# Patient Record
Sex: FEMALE | Race: ASIAN | NOT HISPANIC OR LATINO | Employment: FULL TIME | URBAN - METROPOLITAN AREA
[De-identification: names, ages, dates, MRNs, and addresses within clinical notes are randomized per-mention and may not be internally consistent; named-entity substitution may affect disease eponyms.]

---

## 2021-05-28 ENCOUNTER — RECORDS - HEALTHEAST (OUTPATIENT)
Dept: ADMINISTRATIVE | Facility: CLINIC | Age: 46
End: 2021-05-28

## 2021-06-24 ENCOUNTER — HOSPITAL ENCOUNTER (EMERGENCY)
Dept: EMERGENCY MEDICINE | Facility: HOSPITAL | Age: 46
Discharge: HOME | End: 2021-06-25
Attending: EMERGENCY MEDICINE
Payer: COMMERCIAL

## 2021-06-24 DIAGNOSIS — R50.9 FEVER, UNSPECIFIED FEVER CAUSE: ICD-10-CM

## 2021-06-24 DIAGNOSIS — E11.9 DIABETES MELLITUS WITHOUT COMPLICATION (H): ICD-10-CM

## 2021-06-24 DIAGNOSIS — K08.89 PAIN, DENTAL: ICD-10-CM

## 2021-06-24 LAB
ALBUMIN SERPL-MCNC: 3.9 G/DL (ref 3.5–5)
ALP SERPL-CCNC: 96 U/L (ref 45–120)
ALT SERPL W P-5'-P-CCNC: 18 U/L (ref 0–45)
ANION GAP SERPL CALCULATED.3IONS-SCNC: 12 MMOL/L (ref 5–18)
AST SERPL W P-5'-P-CCNC: 15 U/L (ref 0–40)
BILIRUB SERPL-MCNC: 1.4 MG/DL (ref 0–1)
BUN SERPL-MCNC: 7 MG/DL (ref 8–22)
C REACTIVE PROTEIN LHE: 3.2 MG/DL (ref 0–0.8)
CALCIUM SERPL-MCNC: 9.5 MG/DL (ref 8.5–10.5)
CHLORIDE BLD-SCNC: 103 MMOL/L (ref 98–107)
CO2 SERPL-SCNC: 23 MMOL/L (ref 22–31)
CREAT SERPL-MCNC: 0.73 MG/DL (ref 0.6–1.1)
GFR SERPL CREATININE-BSD FRML MDRD: >60 ML/MIN/1.73M2
GLUCOSE BLD-MCNC: 213 MG/DL (ref 70–125)
LACTATE SERPL-SCNC: 1.2 MMOL/L (ref 0.7–2)
POTASSIUM BLD-SCNC: 3.4 MMOL/L (ref 3.5–5)
PROT SERPL-MCNC: 7.9 G/DL (ref 6–8)
SODIUM SERPL-SCNC: 138 MMOL/L (ref 136–145)

## 2021-06-25 ENCOUNTER — NURSE TRIAGE (OUTPATIENT)
Dept: NURSING | Facility: CLINIC | Age: 46
End: 2021-06-25

## 2021-06-25 ENCOUNTER — COMMUNICATION - HEALTHEAST (OUTPATIENT)
Dept: SCHEDULING | Facility: CLINIC | Age: 46
End: 2021-06-25

## 2021-06-25 NOTE — ED TRIAGE NOTES
Pt with fever, body ache and nausea since this am.  Pt went to urgent care.  Pt white count elevated.  Pt had fever of 101, took Ibuprofen.  Pt had covid vaccine in February.

## 2021-06-26 ENCOUNTER — COMMUNICATION - HEALTHEAST (OUTPATIENT)
Dept: SCHEDULING | Facility: CLINIC | Age: 46
End: 2021-06-26

## 2021-06-26 NOTE — ED PROVIDER NOTES
Expected Patient Referral to ED  7:21 PM    Referring Clinic/Provider:  Urgent Care    Reason for referral/Clinical facts:  Shaking chills, recent dental procedure, UA negative, strep negative, WBC 20,000.  Vaginal discharge with negative wet prep.  Concern for bacteremia given recent procedure and feels needs admission with IV antibiotics.  Did discuss no beds at [facility]    Recommendations provided:  Send to our ED or another ED with bed availability    Caller was informed that this institution does possess the capabilities and/or resources to provide for patient and should be transferred to our institution.    Based on the information provided, discussed that this patient likely is not a good candidate for direct admission to this institution and that provider could proceed as such.  If however direct admit is sought and any hurdles encountered, this ED would be happy to see the patient and evaluate.    Informed caller that recommendations provided are recommendations based only on the facts provided and that they responsible to accept or reject the advice, or to seek a formal in person consultation as needed and that this ED will see/treat patient should they arrive.      Ute Ashford DO  Emergency Medicine  [facility] EMERGENCY DEPARTMENT  [address]  [place] [address]  Dept: [phone]  Loc: [phone]     Ute Ashford DO  06/24/21 1925

## 2021-06-27 ENCOUNTER — HEALTH MAINTENANCE LETTER (OUTPATIENT)
Age: 46
End: 2021-06-27

## 2021-06-29 ENCOUNTER — COMMUNICATION - HEALTHEAST (OUTPATIENT)
Dept: FAMILY MEDICINE | Facility: CLINIC | Age: 46
End: 2021-06-29

## 2021-06-30 LAB
AEROBIC BLOOD CULTURE BOTTLE: NO GROWTH
ANAEROBIC BLOOD CULTURE BOTTLE: NO GROWTH

## 2021-07-04 NOTE — TELEPHONE ENCOUNTER
Telephone Encounter by Behr, Pamela at 6/29/2021  4:15 PM     Author: Behr, Pamela Service: -- Author Type: --    Filed: 6/29/2021  4:16 PM Encounter Date: 6/28/2021 Status: Signed    : Behr, Pamela       Pt called back and we scheduled appt

## 2021-07-04 NOTE — TELEPHONE ENCOUNTER
Lm ok to give pt a same day appt with Bahman for follow up on discharge bur then needs to est care 40 min appt when available

## 2021-07-04 NOTE — ED PROVIDER NOTES
ED Provider Notes by Froylan Rowan MD at 6/24/2021 10:40 PM     Author: Froylan Rowan MD Service: Emergency Medicine Author Type: Physician    Filed: 7/4/2021  2:30 PM Date of Service: 6/24/2021 10:40 PM Status: Signed    : Froylan Rowan MD (Physician)       EMERGENCY DEPARTMENT ENCOUNTER      NAME: Clinton Bragg  AGE: 46 y.o. female  YOB: 1975  MRN: [id number]  EVALUATION DATE & TIME: 6/24/2021  9:48 PM    PCP: Chino Flaherty MD    ED PROVIDER: Froylan Rowan M.D.      Chief Complaint   Patient presents with   ? Fever   ? Abnormal Lab         FINAL IMPRESSION:  1. Fever, unspecified fever cause    2. Diabetes mellitus without complication (H)    3. Pain, dental          ED COURSE & MEDICAL DECISION MAKING:    10:13PM I met with the patient for the initial interview and physical examination. Discussed plan for treatment and workup in the ED. PPE: Provider wore gloves and surgical mask.    Pertinent Labs & Imaging studies reviewed. (See chart for details)  46 y.o. female presents to the Emergency Department for evaluation of typical symptoms.  The patient was referred to the ER from her clinic.  She said that she had been at a dentist yesterday for a crown.  This morning she had multiple symptoms including myalgias, low-grade temperature, chills, sore throat, mild headache fatigue and ear pain.  She was seen in the urgent care where she had complained of some dysuria so a pelvic exam was done and wet prep was negative.  She did have blood test done and this revealed a white count of 20 and a hemoglobin A1c 11.4.  Additionally a Covid test it is pending.  The rapid strep test was done was negative t.  In the ER we rechecked her lab work to include a comprehensive metabolic profile, CRP and lactic acid as the other labs just been done earlier.  Her blood sugar this time was 213.  However she was not acidotic.  The CRP was just slightly elevated.  A chest x-ray was done and  did not show any pneumonia.  The patient will be started on Metformin as she has normal renal and liver function tests.  I did start her on a course of amoxicillin for her dental pain which may be the source of her fever and other symptoms.  She will be following up with her doctor to further manage her diabetes and to follow-up on the fever.  At the conclusion of the encounter I discussed the results of all of the tests and the disposition. The questions were answered. The patient or family acknowledged understanding and was agreeable with the care plan.       MEDICATIONS GIVEN IN THE EMERGENCY:  Medications   sodium chloride 0.9% 1,000 mL (0 mL Intravenous Stopped 6/25/21 0048)   acetaminophen tablet 1,000 mg (TYLENOL) (1,000 mg Oral Given 6/24/21 4722)       NEW PRESCRIPTIONS STARTED AT TODAY'S ER VISIT  Discharge Medication List as of 6/25/2021 12:48 AM      START taking these medications    Details   amoxicillin (AMOXIL) 875 MG tablet Take 1 tablet (875 mg total) by mouth 2 (two) times a day for 10 days., Starting Fri 6/25/2021, Until Mon 7/5/2021, Print      metFORMIN (GLUCOPHAGE) 500 MG tablet Take 1 tablet (500 mg total) by mouth 2 (two) times a day with meals., Starting Fri 6/25/2021, Print                =================================================================    HPI    Patient information was obtained from: patient    Use of Intrepreter: N/A         Clinton Bragg is a 46 y.o. female with a pertinent history of migraines and anemia who presents to this ED as a referral from clinic for evaluation of fever and abnormal lab.    Patient reports that she has had left lower dental pain for a while, had a crown put on it at the dentist yesterday without having a root canal, and felt nauseous last night at home, although no further dental pain. This morning, patient reports myalgias, fever to 100-101, chills, slight headache, sore throat, achy chest pain attributed to muscle aches, fatigue, and slight left  ear pain. Currently rates all of her symptoms 3-4/10. Took ibuprofen at home this afternoon but has not taken tylenol. She went to urgent care earlier today for these symptoms, also mentioned having some vaginal pain and dysuria and thought she might have a UTI, although UA was normal. Notes that her strep was negative, Covid-19 test was pending, WBC of 20.1, and A1c of 11.4. Patient received the [employer] Covid-19 vaccine in February 2020. Otherwise denies rhinorrhea, cough, shortness of breath, congestion, abdominal pain, flank pain, rash, redness on legs, recent tick bites, or any other complaints at this time.    Also reports having pelvic pain which is attributed to her menstrual cycle, as she is getting it in the next couple of days.    REVIEW OF SYSTEMS   Constitutional:  Denies weight loss or weakness, loss of appetite. Positive for fever (100-101), chills, fatigue.  Eyes:  No pain, diplopia, or vision loss  HENT:  Positive for dental pain (left lower; since resolved), sore throat, left ear pain. Negative for rhinorrhea, congestion.  Respiratory: No SOB, wheeze or cough.  Cardiovascular:  No GREENBERG, palpitations, syncope. Positive for chest pain (secondary to myalgias).  GI:  Denies abdominal pain, vomiting, dark/bloody stools, or diarrhea. Positive for nausea.  : Positive for vaginal pain (previously evaluated) and dysuria (previously evaluated). Negative for flank pain.  Musculoskeletal: Positive for myalgias.  Neuro: Positive for headache (slight).  Skin: Negative for rashes or redness.    All other systems negative unless noted in HPI    PAST MEDICAL HISTORY:  History reviewed. No pertinent past medical history.    PAST SURGICAL HISTORY:  Past Surgical History:   Procedure Laterality Date   ? IA LIGATE FALLOPIAN TUBE      Description: Tubal Ligation;  Recorded: 07/16/2008;   ? IA REMOVAL GALLBLADDER      Description: Cholecystectomy;  Recorded: 07/16/2008;  Comments: laparoscopy           CURRENT MEDICATIONS:     No current facility-administered medications on file prior to encounter.      No current outpatient medications on file prior to encounter.       ALLERGIES:  Allergies   Allergen Reactions   ? Naproxen    ? Naproxen Sodium Swelling     naprosyn as well     ? Tetracyclines      GI upset     ? Venom-Honey Bee Swelling       FAMILY HISTORY:  History reviewed. No pertinent family history.    SOCIAL HISTORY:   Social History     Socioeconomic History   ? Marital status:      Spouse name: None   ? Number of children: None   ? Years of education: None   ? Highest education level: None   Occupational History   ? None   Social Needs   ? Financial resource strain: None   ? Food insecurity     Worry: None     Inability: None   ? Transportation needs     Medical: None     Non-medical: None   Tobacco Use   ? Smoking status: Never Smoker   ? Smokeless tobacco: Never Used   Substance and Sexual Activity   ? Alcohol use: None   ? Drug use: None   ? Sexual activity: None   Lifestyle   ? Physical activity     Days per week: None     Minutes per session: None   ? Stress: None   Relationships   ? Social connections     Talks on phone: None     Gets together: None     Attends Sabianist service: None     Active member of club or organization: None     Attends meetings of clubs or organizations: None     Relationship status: None   ? Intimate partner violence     Fear of current or ex partner: None     Emotionally abused: None     Physically abused: None     Forced sexual activity: None   Other Topics Concern   ? None   Social History Narrative   ? None       VITALS:  No data found.    PHYSICAL EXAM    General Appearance: Alert, cooperative,  appears stated age   Head:  Normocephalic, without obvious abnormality, atraumatic  Eyes:  PERRL, conjunctiva/corneas clear  ENT: Mild tenderness over tooth #14.  There is no erythema or swelling at the base.  Lips, mucosa, and tongue normal; gums normal. Small tear of left inferior  TM.  Neck:  Supple, symmetrical, trachea midline, no adenopathy; no carotid  bruit or JVD  Chest:  No tenderness or deformity  Cardio: Regular rate and rhythm without murmur, full symmetric peripheral pulses  Pulm:  Clear to auscultation bilaterally, respirations unlabored, no wheezes, rales or rhonchi.  Back:  Symmetric, no curvature, ROM normal, no CVA tenderness  Abdomen:  Soft, non-tender, bowel sounds active all four quadrants, no masses, no organomegaly  Extremities:  Extremities normal, atraumatic, no cyanosis or edema  Skin:  Skin color, texture, turgor normal, no rashes or lesions  Lymph:  Cervical, supraclavicular, and axillary nodes normal  Neuro:  AOx3, CNs grossly intact, motor and sensory intact throughout the extremities.       LAB:  All pertinent labs reviewed and interpreted.  Results for orders placed or performed during the hospital encounter of 06/24/21   Comprehensive Metabolic Panel   Result Value Ref Range    Sodium 138 136 - 145 mmol/L    Potassium 3.4 (L) 3.5 - 5.0 mmol/L    Chloride 103 98 - 107 mmol/L    CO2 23 22 - 31 mmol/L    Anion Gap, Calculation 12 5 - 18 mmol/L    Glucose 213 (H) 70 - 125 mg/dL    BUN 7 (L) 8 - 22 mg/dL    Creatinine 0.73 0.60 - 1.10 mg/dL    GFR MDRD Af Amer >60 >60 mL/min/1.73m2    GFR MDRD Non Af Amer >60 >60 mL/min/1.73m2    Bilirubin, Total 1.4 (H) 0.0 - 1.0 mg/dL    Calcium 9.5 8.5 - 10.5 mg/dL    Protein, Total 7.9 6.0 - 8.0 g/dL    Albumin 3.9 3.5 - 5.0 g/dL    Alkaline Phosphatase 96 45 - 120 U/L    AST 15 0 - 40 U/L    ALT 18 0 - 45 U/L   C-Reactive Protein   Result Value Ref Range    CRP 3.2 (H) 0.0 - 0.8 mg/dL   Lactic Acid   Result Value Ref Range    Lactic Acid 1.2 0.7 - 2.0 mmol/L       RADIOLOGY:  Reviewed all pertinent imaging. Please see official radiology report.  EXAM: XR CHEST 2 VIEWS  LOCATION: [facility]  DATE/TIME: 6/24/2021 11:18 PM     INDICATION: fever and chills, r/o covid pna  COMPARISON: None.     IMPRESSION:       Heart size is normal. Lungs are clear bilaterally. Mediastinum and visualized bony structures are unremarkable.        I, Mitzy San, am serving as a scribe to document services personally performed by Dr. Rowan based on my observation and the provider's statements to me. I,  Froylan Rowan MD attest that Mitzy San is acting in a scribe capacity, has observed my performance of the services and has documented them in accordance with my direction.    Froylan Rowan M.D.  Emergency Medicine  [facility] EMERGENCY DEPARTMENT  [address]  [facility] [address]  Dept: [phone]  Loc: [phone]       Froylan Rowan MD  07/04/21 1517

## 2021-07-06 ENCOUNTER — COMMUNICATION - HEALTHEAST (OUTPATIENT)
Dept: FAMILY MEDICINE | Facility: CLINIC | Age: 46
End: 2021-07-06

## 2021-07-06 VITALS — WEIGHT: 195 LBS

## 2021-07-07 NOTE — TELEPHONE ENCOUNTER
New Appointment Needed  What is the reason for the visit:    Inpatient/ED Follow Up Appt Request  At what hospital or facility were you seen?: [facility]  What is the reason you were seen?: infection and diabetes   What date were you admitted?: date: 6/24/21  What date were you discharged?: date: 6/24/21  What was the recommended timeframe for your follow up appointment?: 1 week  Provider Preference: Patient would like to establish with Dr. Ruggiero since he see's her mom Yariel Bragg and sister Anson Bragg.  How soon do you need to be seen?: as soon as available. Patient does not want to run out the the medication that was prescribed to her.  Waitlist offered?: No  Okay to leave a detailed message:  Yes

## 2021-07-07 NOTE — TELEPHONE ENCOUNTER
"Calling for results of 6/24 Covid test. It appears the results of the test are still pending. Even though it says \"final result\" there is no result stated and results look like preliminary results only. Informed pt results and not yet available. Advised call back 48-72 hrs after test.       Coronavirus (COVID-19) Notification     Reason for call  Patient requesting results     Lab Result    Lab test 2019-nCoV rRt-PCR in process        RN Recommendations/Instructions per [facility]  Continue quarantee and following instructions until you receive the results     Please Contact your PCP clinic or return to the Emergency department if your:    Symptoms worsen or other concerning symptom's.    Patient informed that if test for COVID19 is POSITIVE, you will receive a call typically within 48 hours from the test date (date lab collected).  If NEGATIVE result, you will receive a letter in the mail or [employer].      Ching Caldera RN  "

## 2021-07-07 NOTE — TELEPHONE ENCOUNTER
Coronavirus (COVID-19) Notification     Reason for call  Patient requesting results     Lab Result    Lab test 2019-nCoV rRt-PCR in process        RN Recommendations/Instructions per [facility]  Continue quarantee and following instructions until you receive the results     Please Contact your PCP clinic or return to the Emergency department if your:    Symptoms worsen or other concerning symptom's.    Patient informed that if test for COVID19 is POSITIVE, you will receive a call typically within 48 hours from the test date (date lab collected).  If NEGATIVE result, you will receive a letter in the mail or [employer].      Christel Roth RN

## 2021-07-11 PROBLEM — K08.89 TOOTH PAIN: Status: ACTIVE | Noted: 2021-07-06

## 2021-07-11 PROBLEM — E11.9 DIABETES MELLITUS, TYPE 2 (H): Status: ACTIVE | Noted: 2021-07-06

## 2021-07-13 ENCOUNTER — VIRTUAL VISIT (OUTPATIENT)
Dept: PHARMACY | Facility: CLINIC | Age: 46
End: 2021-07-13
Payer: COMMERCIAL

## 2021-07-13 DIAGNOSIS — E11.9 TYPE 2 DIABETES MELLITUS WITHOUT COMPLICATION, WITHOUT LONG-TERM CURRENT USE OF INSULIN (H): Primary | ICD-10-CM

## 2021-07-13 DIAGNOSIS — S93.409A SPRAIN OF ANKLE: ICD-10-CM

## 2021-07-13 DIAGNOSIS — S93.409D SPRAIN OF ANKLE, UNSPECIFIED LATERALITY, UNSPECIFIED LIGAMENT, SUBSEQUENT ENCOUNTER: ICD-10-CM

## 2021-07-13 DIAGNOSIS — Z78.9 TAKES DIETARY SUPPLEMENTS: ICD-10-CM

## 2021-07-13 PROCEDURE — 99605 MTMS BY PHARM NP 15 MIN: CPT | Performed by: PHARMACIST

## 2021-07-13 PROCEDURE — 99606 MTMS BY PHARM EST 15 MIN: CPT | Performed by: PHARMACIST

## 2021-07-13 RX ORDER — FLASH GLUCOSE SENSOR
1 KIT MISCELLANEOUS
Qty: 6 EACH | Refills: 3 | Status: SHIPPED | OUTPATIENT
Start: 2021-07-13 | End: 2022-05-20

## 2021-07-13 RX ORDER — CHLORAL HYDRATE 500 MG
1 CAPSULE ORAL DAILY
COMMUNITY

## 2021-07-13 RX ORDER — MULTIVIT-MIN/IRON/FOLIC ACID/K 18-600-40
2000 CAPSULE ORAL DAILY
COMMUNITY

## 2021-07-13 RX ORDER — IBUPROFEN 200 MG
400 TABLET ORAL 2 TIMES DAILY PRN
COMMUNITY

## 2021-07-13 NOTE — PROGRESS NOTES
Medication Therapy Management (MTM) Encounter    ASSESSMENT:                            Medication Adherence/Access: No issues identified    1. Type 2 diabetes mellitus without complication, without long-term current use of insulin (H)  Not at goal A1c less than 7% per ADA guidelines, however on current dose of Metformin and to trying to watch her diet she has significantly improved her blood sugars.  Recommend to initiate freestyle lidia device, if covered by her insurance.  Reviewed benefits of this device, as well as resources to help teach her how to use it.  Recommend to titrate Metformin to goal dose 2000 mg daily, provide education for potential adverse effects as she increases the dose.  Reviewed if needed, she may benefit from addition of SGLT2 inhibitor or GLP-1 agonist both of which would help with her weight loss efforts as well as improving blood sugars.  Acknowledged that she is not very interested in injectable medications, will reevaluate once we start monitoring blood sugars remotely via CGM.    2. Ankle Sprain  Stable, reviewed importance of increasing her activity levels to help with weight loss and improving her blood sugars, however she would benefit from none impact activities such as swimming or biking to ensure that she does not resprained her ankle.  Recommend to continue Osteo Bi-Flex and ibuprofen as needed.    3. Takes dietary supplements  Stable, reviewed that her current supplements will not interact with the freestyle lidia device.  No vitamin D level on file however consider evaluating this in the future.  Recommend to continue current regimen.    PLAN:                            1.  Increase Metformin to 1500 mg daily for 2 weeks then increase to 2000 mg daily  2.  Initiate freestyle lidia    Follow-up: Return in about 2 weeks (around 7/27/2021) for Follow up, using a [employer].     SUBJECTIVE/OBJECTIVE:                          Clinton Bragg is a 46 year old female contacted via  "secure video for an initial visit. She was referred to me from Dr. Ruggiero.      Reason for visit: Medication Management Initial.    Allergies/ADRs: Reviewed in chart  Tobacco: She reports that she has never smoked. She has never used smokeless tobacco.  Alcohol: none  Caffeine: 1 cups/day of coffee   Activity: Due to a sprained ankle she is not as active as she would like to be.  She would previously walk or jog, but she is only more recently able to put more weight on her foot for a longer period of time.  Past Medical History: Reviewed in chart    Medication Adherence/Access: no issues reported    Type 2 Diabetes: This is a new diagnosis as of the end of June.  She has been taking Metformin 500 mg twice daily since June 25.  She has not experienced any gas or diarrhea adverse effects from this medication.  She has been poking her finger to check her blood sugars, but recognizes that she would benefit from a continuous glucose monitor.  When her father was alive he had 1 of these devices, and she was able to remotely monitor his blood sugars as well.  She believes that she would greatly benefit from being able to see how her blood sugars react to her diet and activity.  Her brother also has 1 of these devices.  She would feel comfortable with both a freestyle lidia or a Dexcom.  She does have a more sedentary job, and along with her ankle issues, and has been difficult to be more active.  Most recently her fasting blood sugars were 165, 185.  She has also taking a supplement called \"carbohydrate blocker.\"  She is wondering if she should continue to use this product.  Hemoglobin A1C   Date Value Ref Range Status   06/24/2021 11.7 (H) <=5.6 % Final      Arthritis: Due to ongoing aches and pains in her joints that she has started taking Osteo Bi-Flex which she believes does help, and as needed will use ibuprofen.    Takes dietary supplements: She is on several supplements, which she is trying to be better about taking " on a regular basis.  These include vitamin D, B12, and fish oil.  She had previously taken some additional supplements due to the coronavirus, but has since discontinued these since she is not vaccinated.  No vitamin D level on file.    Today's Vitals:   BP Readings from Last 3 Encounters:   07/06/21 136/80   06/24/21 (!) 158/89   ----------------    I spent 45 minutes with this patient today. All changes were made via collaborative practice agreement with Dr. Ruggiero. A copy of the visit note was provided to the patient's primary care provider.    The patient declined a summary of these recommendations.     Josse Slater, PharmD, BCACP  Medication Management (MTM) Pharmacist  [facility]    Telemedicine Visit Details  Type of service:  Video Conference via [employer]  Start Time: 1:30PM  End Time: 2:15PM  Originating Location (patient location): Home  Distant Location (provider location):  [facility]        Medication Therapy Recommendations  Diabetes mellitus, type 2 (H)    Current Medication: metFORMIN (GLUCOPHAGE) 500 MG tablet   Rationale: Dose too low - Dosage too low - Effectiveness   Recommendation: Increase Dose - Increase Metformin to 1500 mg daily for 2 weeks, then increase to 2000 mg daily   Status: Accepted per CPA          Current Medication: metFORMIN (GLUCOPHAGE) 500 MG tablet   Rationale: Medication requires monitoring - Needs additional monitoring   Recommendation: Self-Monitoring - Order freestyle lidia CGM   Status: Accepted per CPA

## 2021-07-14 NOTE — PATIENT INSTRUCTIONS
PLAN:                            1.  Increase Metformin to 1500 mg daily for 2 weeks then increase to 2000 mg daily  2.  Initiate freestyle lidia    Follow-up: Return in about 2 weeks (around 7/27/2021) for Follow up, using a [employer].

## 2021-07-28 ENCOUNTER — VIRTUAL VISIT (OUTPATIENT)
Dept: PHARMACY | Facility: CLINIC | Age: 46
End: 2021-07-28
Payer: COMMERCIAL

## 2021-07-28 DIAGNOSIS — E11.9 TYPE 2 DIABETES MELLITUS WITHOUT COMPLICATION, WITHOUT LONG-TERM CURRENT USE OF INSULIN (H): Primary | ICD-10-CM

## 2021-07-28 PROCEDURE — 99607 MTMS BY PHARM ADDL 15 MIN: CPT | Performed by: PHARMACIST

## 2021-07-28 PROCEDURE — 99606 MTMS BY PHARM EST 15 MIN: CPT | Performed by: PHARMACIST

## 2021-07-28 NOTE — PROGRESS NOTES
Medication Therapy Management (MTM) Encounter    ASSESSMENT:                            Medication Adherence/Access: No issues identified    1. Type 2 diabetes mellitus without complication, without long-term current use of insulin (H)  Not at goal A1c less than 7% per ADA guidelines, however after his CGM download over the last 2 weeks her blood sugars have been averaging an A1c of 7%.  Tolerating Metformin at 1500 mg daily, therefore recommend to titrate to max dose of 2000 mg daily, which will also help bring down her fasting blood sugars.  Encouraged her to continue to learn what the foods she is eating, and increase activity as able.  Reviewed use of skin tac to help provide a barrier for her CGM sensors and improve adhesion. Reviewed if needed, she may benefit from addition of SGLT2 inhibitor or GLP-1 agonist both of which would help with her weight loss efforts as well as improving blood sugars.  As a recent diagnosis, we have not yet started her on aspirin, statin, nor ACE inhibitor/ARB.  Recommend to evaluate microalbumin when follow-up A1c is collected.    PLAN:                            1. Increase metformin to 2000mg daily   2.  Utilize skin tac to help to tolerate your sensors    Follow-up: Return in about 4 weeks (around 8/25/2021) for Follow up, with me, using a video visit.    SUBJECTIVE/OBJECTIVE:                          Clinton Bragg is a 46 year old female contacted via secure video for a follow-up visit. She was referred to me from Dr. Flaherty.  Today's visit is a follow-up MTM visit from 7/13/21.      Reason for visit: Medication Management Follow up.    Allergies/ADRs: Reviewed in chart  Past Medical History: Reviewed in chart  Tobacco: She reports that she has never smoked. She has never used smokeless tobacco.  Alcohol: never used    Medication Adherence/Access: no issues reported    Type 2 Diabetes: Reviewed blood glucose data via remote monitoring of her freestyle lidia as shown below.  She  is on an extensive amount about how her blood sugars fluctuate with food and exercise.  She has increased the amount of exercise that she has been doing as tolerated with her ankle pain.  She finds that now when she has elevated blood sugars that she will get a headache.  Denies vision changes, she does continue to experience dry mouth.  She continues on 1500 mg daily dose of Metformin, tolerating without adverse effects.  She is found that she is more sensitive to the adhesive within a freestyle lidia sensor, she is wondering if there is anything she can do to help decrease redness and irritation.  Hemoglobin A1C   Date Value Ref Range Status   06/24/2021 11.7 (H) <=5.6 % Final      No results found for: UMALCR   No results found for: LDL  Creatinine   Date Value Ref Range Status   06/24/2021 0.73 0.60 - 1.10 mg/dL Final         Today's Vitals:   BP Readings from Last 3 Encounters:   07/06/21 136/80   06/24/21 (!) 158/89     ----------------      I spent 30 minutes with this patient today. All changes were made via collaborative practice agreement with Dr. Flaherty. A copy of the visit note was provided to the patient's primary care provider.    The patient declined a summary of these recommendations.     Josse Slater, PharmD, BCACP  Medication Management (MTM) Pharmacist  [facility]    Telemedicine Visit Details  Type of service:  Video Conference via [employer]  Start Time: 12:30PM  End Time: 1PM  Originating Location (patient location): Home  Distant Location (provider location):  [facility]     Medication Therapy Recommendations  No medication therapy recommendations to display

## 2021-07-29 NOTE — PATIENT INSTRUCTIONS
PLAN:                            1. Increase metformin to 2000mg daily   2.  Utilize skin tac to help to tolerate your sensors    Follow-up: Return in about 4 weeks (around 8/25/2021) for Follow up, with me, using a video visit.

## 2021-08-24 ENCOUNTER — VIRTUAL VISIT (OUTPATIENT)
Dept: PHARMACY | Facility: CLINIC | Age: 46
End: 2021-08-24
Payer: COMMERCIAL

## 2021-08-24 DIAGNOSIS — E11.9 TYPE 2 DIABETES MELLITUS WITHOUT COMPLICATION, WITHOUT LONG-TERM CURRENT USE OF INSULIN (H): Primary | ICD-10-CM

## 2021-08-24 PROCEDURE — 99606 MTMS BY PHARM EST 15 MIN: CPT | Performed by: PHARMACIST

## 2021-08-24 PROCEDURE — 99607 MTMS BY PHARM ADDL 15 MIN: CPT | Performed by: PHARMACIST

## 2021-08-24 NOTE — PATIENT INSTRUCTIONS
PLAN:                            1. Continue current medication regimen.     Follow-up: Return in about 8 weeks (around 10/19/2021) for Follow up, with me, using a MyChart eVisit.

## 2021-08-24 NOTE — PROGRESS NOTES
Medication Therapy Management (MTM) Encounter    ASSESSMENT:                            Medication Adherence/Access: No issues identified    1. Type 2 diabetes mellitus without complication, without long-term current use of insulin (H)  Not at goal A1c less than 7% per ADA guidelines, however after this is CGM download, her blood sugars are estimating at an A1c less than 7%.  Recommend to continue max dose Metformin, and utilizing continuous glucose monitor to help make decisions around food and activity.  If needed, she may benefit from addition of SGLT2 inhibitor or GLP-1 agonist both of which would help with her weight loss efforts as well as improving blood sugars.  As a recent diagnosis, we have not yet started her on aspirin, statin, nor ACE inhibitor/ARB.  Recommend to evaluate microalbumin when follow-up A1c is collected.  She will call to schedule follow-up with Dr. Ruggiero once her CT scan is completed, at that follow-up appointment recommend updated BMP, A1c, microalbumin, fasting lipid panel.    PLAN:                            1. Continue current medication regimen.     Follow-up: Return in about 8 weeks (around 10/19/2021) for Follow up, with me, using a [employer] eVisit.    SUBJECTIVE/OBJECTIVE:                          Clinton Bragg is a 46 year old female contacted via secure video for a follow-up visit. She was referred to me from Dr. Ruggiero.  Today's visit is a follow-up MTM visit from 7/13/21.      Reason for visit: Medication Management Follow up.    Allergies/ADRs: Reviewed in chart  Past Medical History: Reviewed in chart  Tobacco: She reports that she has never smoked. She has never used smokeless tobacco.  Alcohol: never used    Medication Adherence/Access: no issues reported    Type 2 Diabetes: Follow-up today to review his CGM data.  Over the last month she has been able to titrate her Metformin to 2000 mg daily, denies signs or symptoms of adverse effects.  She is been staying away from  carbohydrates, and placing notes when she does have a sandwich, etc.  She has not gotten on the scale or taken her weight within the last month since our last follow-up.  She believes that her close may be slightly looser.  She has been much more active, and has worked up to being able to do a 3 mile walk every day.  She has been able to see on her continuous glucose monitor that her blood sugars do significantly improve after exercise.  Her ankles actually feels significantly better with less pain and now that she has been more active.  She can now run up her stairs without being short of breath as well.  She is monitoring her blood sugars very frequently throughout the day, and does acknowledge that keeping her blood sugars low and monitoring so often may make her more anxious about the choices she is making throughout the day.  She is due for follow-up with her primary doctor, and she had a few other steps that she was to complete prior to this follow-up however she does have some additional bills that she needs to take care of first.  Hemoglobin A1C   Date Value Ref Range Status   06/24/2021 11.7 (H) <=5.6 % Final     Creatinine   Date Value Ref Range Status   06/24/2021 0.73 0.60 - 1.10 mg/dL Final           Today's Vitals:   BP Readings from Last 3 Encounters:   07/06/21 136/80   06/24/21 (!) 158/89     ----------------    I spent 30 minutes with this patient today. All changes were made via collaborative practice agreement with Dr. Flaherty. A copy of the visit note was provided to the patient's primary care provider.    The patient declined a summary of these recommendations.     Josse Slater, PharmD, BCACP  Medication Management (MTM) Pharmacist  [facility]    Telemedicine Visit Details  Type of service:  Video Conference via [employer]  Start Time: 12:30PM  End Time: 1PM  Originating Location (patient location): Home  Distant Location (provider location):  [facility]  [employer]     Medication Therapy Recommendations  No medication therapy recommendations to display

## 2021-10-17 ENCOUNTER — HEALTH MAINTENANCE LETTER (OUTPATIENT)
Age: 46
End: 2021-10-17

## 2021-12-10 ENCOUNTER — LAB (OUTPATIENT)
Dept: LAB | Facility: CLINIC | Age: 46
End: 2021-12-10
Payer: COMMERCIAL

## 2021-12-10 DIAGNOSIS — E11.9 DIABETES MELLITUS WITHOUT COMPLICATION (H): ICD-10-CM

## 2021-12-10 DIAGNOSIS — E11.9 TYPE 2 DIABETES MELLITUS WITHOUT COMPLICATION, WITHOUT LONG-TERM CURRENT USE OF INSULIN (H): ICD-10-CM

## 2021-12-10 LAB
ALBUMIN SERPL-MCNC: 3.8 G/DL (ref 3.5–5)
ALP SERPL-CCNC: 47 U/L (ref 45–120)
ALT SERPL W P-5'-P-CCNC: 11 U/L (ref 0–45)
ANION GAP SERPL CALCULATED.3IONS-SCNC: 8 MMOL/L (ref 5–18)
AST SERPL W P-5'-P-CCNC: 14 U/L (ref 0–40)
BILIRUB SERPL-MCNC: 1.2 MG/DL (ref 0–1)
BUN SERPL-MCNC: 12 MG/DL (ref 8–22)
CALCIUM SERPL-MCNC: 9 MG/DL (ref 8.5–10.5)
CHLORIDE BLD-SCNC: 107 MMOL/L (ref 98–107)
CHOLEST SERPL-MCNC: 193 MG/DL
CO2 SERPL-SCNC: 23 MMOL/L (ref 22–31)
CREAT SERPL-MCNC: 0.66 MG/DL (ref 0.6–1.1)
CREAT UR-MCNC: 70 MG/DL
FASTING STATUS PATIENT QL REPORTED: YES
GFR SERPL CREATININE-BSD FRML MDRD: >90 ML/MIN/1.73M2
GLUCOSE BLD-MCNC: 115 MG/DL (ref 70–125)
HBA1C MFR BLD: 6 % (ref 0–5.6)
HDLC SERPL-MCNC: 53 MG/DL
LDLC SERPL CALC-MCNC: 111 MG/DL
MICROALBUMIN UR-MCNC: <0.5 MG/DL (ref 0–1.99)
MICROALBUMIN/CREAT UR: NORMAL MG/G{CREAT}
POTASSIUM BLD-SCNC: 4.2 MMOL/L (ref 3.5–5)
PROT SERPL-MCNC: 7 G/DL (ref 6–8)
SODIUM SERPL-SCNC: 138 MMOL/L (ref 136–145)
TRIGL SERPL-MCNC: 144 MG/DL

## 2021-12-10 PROCEDURE — 36415 COLL VENOUS BLD VENIPUNCTURE: CPT

## 2021-12-10 PROCEDURE — 83036 HEMOGLOBIN GLYCOSYLATED A1C: CPT

## 2021-12-10 PROCEDURE — 80053 COMPREHEN METABOLIC PANEL: CPT

## 2021-12-10 PROCEDURE — 80061 LIPID PANEL: CPT

## 2021-12-10 PROCEDURE — 82043 UR ALBUMIN QUANTITATIVE: CPT

## 2022-01-26 ENCOUNTER — NURSE TRIAGE (OUTPATIENT)
Dept: NURSING | Facility: CLINIC | Age: 47
End: 2022-01-26
Payer: COMMERCIAL

## 2022-01-26 NOTE — TELEPHONE ENCOUNTER
Patient is calling and states that Clinton tested positive for covid on Saturday Jan 22nd.  Symptoms are congestion and sore throat and having coughing mucus and head pressure.  Today is having ear pressure.  Denies fever and denies shortness of breath.  Symptoms started on Jan 17th.  Patient is wanting to know if she would be a candidate for covid merk medication.  Blood sugar has been in good range.  Denies chest pain and denies shortness of breath.  Patient is requesting to speak with Brendan Dunne about Merk medication for covid.  Please phone [employer].    COVID 19 Nurse Triage Plan/Patient Instructions    Please be aware that novel coronavirus (COVID-19) may be circulating in the community. If you develop symptoms such as fever, cough, or SOB or if you have concerns about the presence of another infection including coronavirus (COVID-19), please contact your health care provider or visit [email or web address].     Disposition/Instructions    Home care recommended. Follow home care protocol based instructions.    Thank you for taking steps to prevent the spread of this virus.  o Limit your contact with others.  o Wear a simple mask to cover your cough.  o Wash your hands well and often.    Resources    [facility]: About COVID-19: [email or web address]    CDC: What to Do If You're Sick: www.cdc.gov/coronavirus/2019-ncov/about/steps-when-sick.html    CDC: Ending Home Isolation: www.cdc.gov/coronavirus/2019-ncov/hcp/disposition-in-home-patients.html     CDC: Caring for Someone: www.cdc.gov/coronavirus/2019-ncov/if-you-are-sick/care-for-someone.html     [facility]: Interim Guidance for Hospital Discharge to Home: www.health.[facility].[place].us/diseases/coronavirus/hcp/hospdischarge.pdf    [facility] clinical trials (COVID-19 research studies): [email or web address]     Below are the COVID-19 hotlines at the [place] Department of Health ([facility]). Interpreters are  available.   o For health questions: Call 715-242-4906 or 1-475.752.9085 (7 a.m. to 7 p.m.)  o For questions about schools and childcare: Call 689-644-3561 or 1-756.511.7178 (7 a.m. to 7 p.m.)                       Reason for Disposition    [1] COVID-19 diagnosed by positive lab test AND [2] mild symptoms (e.g., cough, fever, others) AND [3] no complications or SOB    Additional Information    Negative: SEVERE difficulty breathing (e.g., struggling for each breath, speaks in single words)    Negative: Difficult to awaken or acting confused (e.g., disoriented, slurred speech)    Negative: Bluish (or gray) lips or face now    Negative: Shock suspected (e.g., cold/pale/clammy skin, too weak to stand, low BP, rapid pulse)    Negative: Sounds like a life-threatening emergency to the triager    Negative: SEVERE or constant chest pain or pressure (Exception: mild central chest pain, present only when coughing)    Negative: MODERATE difficulty breathing (e.g., speaks in phrases, SOB even at rest, pulse 100-120)    Negative: [1] Headache AND [2] stiff neck (can't touch chin to chest)    Negative: MILD difficulty breathing (e.g., minimal/no SOB at rest, SOB with walking, pulse <100)    Negative: Chest pain or pressure    Negative: Patient sounds very sick or weak to the triager    Negative: Fever > 103 F (39.4 C)    Negative: [1] Fever > 101 F (38.3 C) AND [2] age > 60 years    Negative: [1] Fever > 100.0 F (37.8 C) AND [2] bedridden (e.g., nursing home patient, CVA, chronic illness, recovering from surgery)    Negative: HIGH RISK for severe COVID complications (e.g., age > 64 years, obesity with BMI > 25, pregnant, chronic lung disease or other chronic medical condition)  (Exception: Already seen by PCP and no new or worsening symptoms.)    Negative: [1] HIGH RISK patient AND [2] influenza is widespread in the community AND [3] ONE OR MORE respiratory symptoms: cough, sore throat, runny or stuffy nose    Negative: [1] HIGH  RISK patient AND [2] influenza exposure within the last 7 days AND [3] ONE OR MORE respiratory symptoms: cough, sore throat, runny or stuffy nose    Negative: [1] COVID-19 infection suspected by caller or triager AND [2] mild symptoms (cough, fever, or others) AND [3] negative COVID-19 rapid test    Negative: Fever present > 3 days (72 hours)    Negative: [1] Fever returns after gone for over 24 hours AND [2] symptoms worse or not improved    Negative: [1] Continuous (nonstop) coughing interferes with work or school AND [2] no improvement using cough treatment per protocol    Negative: Cough present > 3 weeks    Negative: [1] COVID-19 diagnosed by positive lab test AND [2] NO symptoms (e.g., cough, fever, others)    Protocols used: CORONAVIRUS (COVID-19) DIAGNOSED OR [id number] 8.25.2021

## 2022-01-26 NOTE — TELEPHONE ENCOUNTER
Contacted patient. Discussed symptomatic treatment for patient. Patient states she is taking sudafed, fluticasone, salt water nasal washes and will start taking Tylenol or Ibuprofen as needed. Patient states she is feeling tired and has congestion in her head. Discussed with patient to schedule an E-visit through [employer] if she'd like to discuss her symptoms further.  Discussed with patient she is out of the 5 day from symptoms window for medication treatment of Paxlovid. Patient states understanding. No further concerns or questions.

## 2022-01-26 NOTE — TELEPHONE ENCOUNTER
This would need to be a visit to discuss, but in general this medication is only indicated in the first five days of symptoms.  She is out of that window so she is not a candidate

## 2022-04-03 ENCOUNTER — HEALTH MAINTENANCE LETTER (OUTPATIENT)
Age: 47
End: 2022-04-03

## 2022-05-17 DIAGNOSIS — E11.9 TYPE 2 DIABETES MELLITUS WITHOUT COMPLICATION, WITHOUT LONG-TERM CURRENT USE OF INSULIN (H): ICD-10-CM

## 2022-05-17 NOTE — TELEPHONE ENCOUNTER
Reason for Call:  Medication or medication refill:    Do you use a [facility]?  Name of the pharmacy and phone number for the current request:  [facility]-updated    Name of the medication requested:   Continuous Blood Gluc Sensor (FREESTYLE FABIO 14 DAY SENSOR) MISC 6 each 3 7/13/2021  --   Sig - Route: 1 Device every 14 days Change sensor as directed every 14 days - Does not apply   Sent to pharmacy as: FreeStyle Fabio 14 Day Sensor       Pt is also requseting Freestyle/ Abbott test strips and needles she tests when she changes her sensor about every 2 weeks and about 4 times per day.  And also whemn her blood sugar is too high to low then she will test as well.      Other request: pt used last one and pharmacy stated not refills.  Pt does have appt in July with Dr Ruggiero    Can we leave a detailed message on this number? YES    Phone number patient can be reached at: Cell number on file:    Telephone Information:   Mobile [phone]       Best Time: any    Call taken on 5/17/2022 at 12:51 PM by Pam J. Behr

## 2022-05-19 NOTE — TELEPHONE ENCOUNTER
Pending orders, will run through protocol.     Linda Santana RN  [facility] Nurse Advisor

## 2022-05-19 NOTE — TELEPHONE ENCOUNTER
Routing refill request to provider for review/approval because:  Unable to find prior order for test strips and needles.  RX for the sensor is pended.  Please write orders as desired.      Linda Santana RN  [facility] Nurse Advisor

## 2022-05-20 RX ORDER — FLASH GLUCOSE SENSOR
1 KIT MISCELLANEOUS
Qty: 6 EACH | Refills: 3 | Status: SHIPPED | OUTPATIENT
Start: 2022-05-20 | End: 2022-07-01

## 2022-07-01 ENCOUNTER — OFFICE VISIT (OUTPATIENT)
Dept: FAMILY MEDICINE | Facility: CLINIC | Age: 47
End: 2022-07-01
Payer: COMMERCIAL

## 2022-07-01 VITALS
SYSTOLIC BLOOD PRESSURE: 132 MMHG | DIASTOLIC BLOOD PRESSURE: 78 MMHG | HEART RATE: 74 BPM | WEIGHT: 179.2 LBS | OXYGEN SATURATION: 99 %

## 2022-07-01 DIAGNOSIS — G43.709 CHRONIC MIGRAINE WITHOUT AURA WITHOUT STATUS MIGRAINOSUS, NOT INTRACTABLE: ICD-10-CM

## 2022-07-01 DIAGNOSIS — E11.9 TYPE 2 DIABETES MELLITUS WITHOUT COMPLICATION, WITHOUT LONG-TERM CURRENT USE OF INSULIN (H): Primary | ICD-10-CM

## 2022-07-01 DIAGNOSIS — R41.3 MEMORY CHANGE: ICD-10-CM

## 2022-07-01 DIAGNOSIS — E53.8 VITAMIN B12 DEFICIENCY (NON ANEMIC): ICD-10-CM

## 2022-07-01 DIAGNOSIS — Z13.21 ENCOUNTER FOR VITAMIN DEFICIENCY SCREENING: ICD-10-CM

## 2022-07-01 LAB
ALBUMIN SERPL BCG-MCNC: 4.2 G/DL (ref 3.5–5.2)
ALP SERPL-CCNC: 53 U/L (ref 35–104)
ALT SERPL W P-5'-P-CCNC: 19 U/L (ref 10–35)
ANION GAP SERPL CALCULATED.3IONS-SCNC: 14 MMOL/L (ref 7–15)
AST SERPL W P-5'-P-CCNC: 19 U/L (ref 10–35)
BILIRUB SERPL-MCNC: 0.7 MG/DL
BUN SERPL-MCNC: 10.5 MG/DL (ref 6–20)
CALCIUM SERPL-MCNC: 9.4 MG/DL (ref 8.6–10)
CHLORIDE SERPL-SCNC: 104 MMOL/L (ref 98–107)
CREAT SERPL-MCNC: 0.48 MG/DL (ref 0.51–0.95)
DEPRECATED HCO3 PLAS-SCNC: 22 MMOL/L (ref 22–29)
ERYTHROCYTE [DISTWIDTH] IN BLOOD BY AUTOMATED COUNT: 14 % (ref 10–15)
GFR SERPL CREATININE-BSD FRML MDRD: >90 ML/MIN/1.73M2
GLUCOSE SERPL-MCNC: 115 MG/DL (ref 70–99)
HBA1C MFR BLD: 6.3 % (ref 0–5.6)
HCT VFR BLD AUTO: 35.8 % (ref 35–47)
HGB BLD-MCNC: 11.4 G/DL (ref 11.7–15.7)
MAGNESIUM SERPL-MCNC: 1.9 MG/DL (ref 1.7–2.3)
MCH RBC QN AUTO: 24 PG (ref 26.5–33)
MCHC RBC AUTO-ENTMCNC: 31.8 G/DL (ref 31.5–36.5)
MCV RBC AUTO: 75 FL (ref 78–100)
PLATELET # BLD AUTO: 327 10E3/UL (ref 150–450)
POTASSIUM SERPL-SCNC: 4.2 MMOL/L (ref 3.4–5.3)
PROT SERPL-MCNC: 7.3 G/DL (ref 6.4–8.3)
RBC # BLD AUTO: 4.75 10E6/UL (ref 3.8–5.2)
SODIUM SERPL-SCNC: 140 MMOL/L (ref 136–145)
TSH SERPL DL<=0.005 MIU/L-ACNC: 1.89 UIU/ML (ref 0.3–4.2)
VIT B12 SERPL-MCNC: 2155 PG/ML (ref 232–1245)
WBC # BLD AUTO: 9.8 10E3/UL (ref 4–11)

## 2022-07-01 PROCEDURE — 82607 VITAMIN B-12: CPT | Performed by: FAMILY MEDICINE

## 2022-07-01 PROCEDURE — 83735 ASSAY OF MAGNESIUM: CPT | Performed by: FAMILY MEDICINE

## 2022-07-01 PROCEDURE — 80053 COMPREHEN METABOLIC PANEL: CPT | Performed by: FAMILY MEDICINE

## 2022-07-01 PROCEDURE — 83036 HEMOGLOBIN GLYCOSYLATED A1C: CPT | Performed by: FAMILY MEDICINE

## 2022-07-01 PROCEDURE — 99213 OFFICE O/P EST LOW 20 MIN: CPT | Performed by: FAMILY MEDICINE

## 2022-07-01 PROCEDURE — 36415 COLL VENOUS BLD VENIPUNCTURE: CPT | Performed by: FAMILY MEDICINE

## 2022-07-01 PROCEDURE — 85027 COMPLETE CBC AUTOMATED: CPT | Performed by: FAMILY MEDICINE

## 2022-07-01 PROCEDURE — 84443 ASSAY THYROID STIM HORMONE: CPT | Performed by: FAMILY MEDICINE

## 2022-07-01 PROCEDURE — 82306 VITAMIN D 25 HYDROXY: CPT | Performed by: FAMILY MEDICINE

## 2022-07-01 RX ORDER — FLASH GLUCOSE SENSOR
1 KIT MISCELLANEOUS
Qty: 6 EACH | Refills: 3 | Status: SHIPPED | OUTPATIENT
Start: 2022-07-01 | End: 2023-10-02

## 2022-07-01 NOTE — PROGRESS NOTES
Patient Instructions   Thanks for coming in Clinton    Say hellisa to everybody    Lets have you do your A1c today    Repeated in 4 months    Your blood pressure is very much at goal      Goal A1c for you is less than 6.5%    Continue to stay active  Eliminate added sugars    Replace vitamin B12 as this is related to metformin        Problem List Items Addressed This Visit        Nervous and Auditory    Migraine Headache       Endocrine    Diabetes mellitus, type 2 (H) - Primary    Relevant Medications    Continuous Blood Gluc Sensor (FREESTYLE FABIO 14 DAY SENSOR) MISC    metFORMIN (GLUCOPHAGE) 500 MG tablet    Other Relevant Orders    Hemoglobin A1c    Comprehensive metabolic panel (BMP + Alb, Alk Phos, ALT, AST, Total. Bili, TP)    CBC with platelets    TSH with free T4 reflex    Magnesium      Other Visit Diagnoses     Memory change        Vitamin B12 deficiency (non anemic)        Relevant Orders    Vitamin B12    Encounter for vitamin deficiency screening        Relevant Orders    Vitamin D Deficiency            Subjective   Clinton is a 47 year old, presenting for the following health issues:  Diabetes and Recheck Medication      History of Present Illness       Diabetes:   She presents for follow up of diabetes.  She is checking home blood glucose with a continuous glucose monitor.  She checks blood glucose before and after meals.  Blood glucose is sometimes over 200 and sometimes under 70. She is aware of hypoglycemia symptoms including dizziness. She has no concerns regarding her diabetes at this time.  She is not experiencing numbness or burning in feet, excessive thirst, blurry vision, weight changes or redness, sores or blisters on feet. The patient has not had a diabetic eye exam in the last 12 months.         She eats 0-1 servings of fruits and vegetables daily.She consumes 1 sweetened beverage(s) daily.She exercises with enough effort to increase her heart rate 10 to 19 minutes per day.  She exercises with  pt comes to ED for allergic reaction. pt was seen at [facility] on Sunday for allergic reaction to plums. pt was told to take benadryl and prednisone. pt states the meds are not helping the hives so she decided to come back to the ER. pt was told to go to an allergist but she was not able to make an appt. pt VSS pt in NAD resp even and unlabored. enough effort to increase her heart rate 3 or less days per week.   She is taking medications regularly.             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  Physical Exam   Lungs are clear  Cardiac no murmur  Recheck of her blood pressure 132/78  Lower extremity edema  Normal monofilament testing                  .  ..

## 2022-07-01 NOTE — PATIENT INSTRUCTIONS
Thanks for coming in Clinton    Say soledadlo to everybody    Lets have you do your A1c today    Repeated in 4 months    Your blood pressure is very much at goal      Goal A1c for you is less than 6.5%    Continue to stay active  Eliminate added sugars    Replace vitamin B12 as this is related to metformin

## 2022-07-02 LAB — DEPRECATED CALCIDIOL+CALCIFEROL SERPL-MC: 22 UG/L (ref 20–75)

## 2022-07-24 ENCOUNTER — HEALTH MAINTENANCE LETTER (OUTPATIENT)
Age: 47
End: 2022-07-24

## 2022-08-09 ENCOUNTER — TELEPHONE (OUTPATIENT)
Dept: OPHTHALMOLOGY | Facility: CLINIC | Age: 47
End: 2022-08-09

## 2022-08-09 NOTE — TELEPHONE ENCOUNTER
[facility]    Phone Message    May a detailed message be left on voicemail: yes     Reason for Call: Appointment Intake    Referring Provider Name: Self   Diagnosis and/or Symptoms: Floater   - Pt states she has had a floater since April , has not been able to have seen due to her mom being ill .    Per protocols send high priority TE to clinic pool , please reach out to pt to schedule .thank you     Action Taken: Other: eye     Travel Screening: Not Applicable

## 2022-08-09 NOTE — TELEPHONE ENCOUNTER
Spoke to patient at 1350    Pt would like to tranfer care to Ophthalmologist for floater in eye since April.    Pt would like updated glasses Rx    --last eye exam about a year ago.    Scheduled exam tomorrow with Dr. Ospina    Pt aware of date/time/location/duration/hospital based clinic/non-[employer] insurance/main clinic number.    Pt also wears contacts and aware separate exam with contact lens provider    Scheduled with Dr. Mckinnon on a Friday at [facility] location-- September 23rd    Pt aware of date/time/location at [facility]    Chino Nicholas RN 1:54 PM 08/09/22

## 2022-08-12 ENCOUNTER — OFFICE VISIT (OUTPATIENT)
Dept: OPHTHALMOLOGY | Facility: CLINIC | Age: 47
End: 2022-08-12
Attending: STUDENT IN AN ORGANIZED HEALTH CARE EDUCATION/TRAINING PROGRAM
Payer: COMMERCIAL

## 2022-08-12 DIAGNOSIS — Z01.00 DIABETIC EYE EXAM (H): Primary | ICD-10-CM

## 2022-08-12 DIAGNOSIS — Z97.3 USES CONTACT LENSES: ICD-10-CM

## 2022-08-12 DIAGNOSIS — E11.9 DIABETIC EYE EXAM (H): Primary | ICD-10-CM

## 2022-08-12 DIAGNOSIS — H52.4 MYOPIA WITH PRESBYOPIA OF BOTH EYES: ICD-10-CM

## 2022-08-12 DIAGNOSIS — H43.811 POSTERIOR VITREOUS DETACHMENT OF RIGHT EYE: ICD-10-CM

## 2022-08-12 DIAGNOSIS — H52.13 MYOPIA WITH PRESBYOPIA OF BOTH EYES: ICD-10-CM

## 2022-08-12 PROCEDURE — 92004 COMPRE OPH EXAM NEW PT 1/>: CPT | Performed by: STUDENT IN AN ORGANIZED HEALTH CARE EDUCATION/TRAINING PROGRAM

## 2022-08-12 PROCEDURE — G0463 HOSPITAL OUTPT CLINIC VISIT: HCPCS

## 2022-08-12 ASSESSMENT — REFRACTION_WEARINGRX
OS_SPHERE: -7.25
OD_CYLINDER: SPHERE
OD_SPHERE: -6.00
SPECS_TYPE: SVL
OS_CYLINDER: +0.25
OS_AXIS: 007

## 2022-08-12 ASSESSMENT — CONF VISUAL FIELD
OD_NORMAL: 1
METHOD: COUNTING FINGERS
OS_NORMAL: 1

## 2022-08-12 ASSESSMENT — VISUAL ACUITY
OD_CC+: +2
OD_CC: 20/25
OS_CC: 20/25
OS_CC+: +2
METHOD: SNELLEN - LINEAR

## 2022-08-12 ASSESSMENT — REFRACTION_MANIFEST
OD_SPHERE: -6.25
OD_CYLINDER: SPHERE
OS_AXIS: 005
OD_ADD: +2.00
OS_SPHERE: -7.50
OS_ADD: +2.00
OS_CYLINDER: +0.25

## 2022-08-12 ASSESSMENT — TONOMETRY
IOP_METHOD: ICARE
OS_IOP_MMHG: 16
OD_IOP_MMHG: 13

## 2022-08-12 ASSESSMENT — EXTERNAL EXAM - RIGHT EYE: OD_EXAM: NORMAL

## 2022-08-12 ASSESSMENT — EXTERNAL EXAM - LEFT EYE: OS_EXAM: NORMAL

## 2022-08-12 ASSESSMENT — CUP TO DISC RATIO
OS_RATIO: 0.1
OD_RATIO: 0.1

## 2022-08-12 ASSESSMENT — SLIT LAMP EXAM - LIDS
COMMENTS: NORMAL
COMMENTS: NORMAL

## 2022-08-12 NOTE — PROGRESS NOTES
Chief Complaint(s) and History of Present Illness(es)     Diabetic Eye Exam     Associated symptoms: floaters (RE - round [place] that comes and goes -   may be better).  Negative for discharge, itching and flashes    Diabetes Type: Type 2 and taking oral medications    Blood Sugars: fluctuates    Treatments tried: no treatments and glasses    Pain scale: 0/10      Comments    New pt here today for comprehensive diabetic eye exam.  States had a floater in April and it did not go away..  No pain or discomfort - pt also a CL wearer and has appointment with Dr. DENTON   for fitting.    Ocular meds = none    Samir Cheema COA, CHAD 2:38 PM 08/12/2022      Review of systems for the eyes was negative other than the pertinent positives/negatives listed in the HPI.    Ocular Meds: none    Ocular Hx: myopia/presbyopia OU contact lens use OU    FOHx: brother with glaucoma    Assessment & Plan      Clinton Bragg is a 47 year old female with the following diagnoses:    1. Diabetic eye exam (H)    2. Posterior vitreous detachment of right eye    3. Myopia with presbyopia of both eyes    4. Uses contact lenses      T2DM without Retinopathy OU  - strict BP/BG control  - yearly DFE    Posterior Vitreous Detachment OD  - not acute, no change in floater, no new flashes, no curtaining  - no history of trauma; patient is a high myope  -  with no holes, tears, or detachment, walter's negative    Myopia with Presbyopia OU  - excellent BCVA  - new rx provided today    Contact Lens Use OU  - counseled importance of good hygiene and not sleeping in contacts or swimming in contacts to minimize risk of infection    Patient disposition:   Return in about 1 year (around 8/12/2023) for Annual Visit, VTD, Refraction, General with Dr Opsina.      Attending Physician Attestation:  Complete documentation of historical and exam elements from today's encounter can be found in the full encounter summary report (not reduplicated in this progress note).   I personally obtained the chief complaint(s) and history of present illness.  I confirmed and edited as necessary the review of systems, past medical/surgical history, family history, social history, and examination findings as documented by others; and I examined the patient myself.  I personally reviewed the relevant tests, images, and reports as documented above.  I formulated and edited as necessary the assessment and plan and discussed the findings and management plan with the patient and family. . - Chapis Ospina MD

## 2022-08-12 NOTE — NURSING NOTE
Chief Complaints and History of Present Illnesses   Patient presents with     Diabetic Eye Exam     Chief Complaint(s) and History of Present Illness(es)     Diabetic Eye Exam     Associated symptoms: floaters (RE - round [place] that comes and goes - may be better).  Negative for discharge, itching and flashes    Diabetes Type: Type 2 and taking oral medications    Blood Sugars: fluctuates    Treatments tried: no treatments and glasses    Pain scale: 0/10              Comments     New pt here today for comprehensive diabetic eye exam.  States had a floater in April and it did not go away..  No pain or discomfort - pt also a CL wearer and has appointment with Dr. DENTON for fitting.    Ocular meds = none    Samir RAZA, CHAD 2:38 PM 08/12/2022

## 2022-09-28 ENCOUNTER — TELEPHONE (OUTPATIENT)
Dept: OPHTHALMOLOGY | Facility: CLINIC | Age: 47
End: 2022-09-28

## 2022-09-28 NOTE — TELEPHONE ENCOUNTER
Spoke with patient regarding Ccancelled appointment due to provider out of clinic. Patient declined rescheduling as offered for 10/3/22 at this time and will call if further scheduling is  needed. -Per Patient

## 2022-10-02 ENCOUNTER — HEALTH MAINTENANCE LETTER (OUTPATIENT)
Age: 47
End: 2022-10-02

## 2023-02-11 ENCOUNTER — HEALTH MAINTENANCE LETTER (OUTPATIENT)
Age: 48
End: 2023-02-11

## 2023-05-20 ENCOUNTER — HEALTH MAINTENANCE LETTER (OUTPATIENT)
Age: 48
End: 2023-05-20

## 2023-08-10 DIAGNOSIS — E11.9 TYPE 2 DIABETES MELLITUS WITHOUT COMPLICATION, WITHOUT LONG-TERM CURRENT USE OF INSULIN (H): ICD-10-CM

## 2023-08-12 ENCOUNTER — HEALTH MAINTENANCE LETTER (OUTPATIENT)
Age: 48
End: 2023-08-12

## 2023-10-02 ENCOUNTER — OFFICE VISIT (OUTPATIENT)
Dept: FAMILY MEDICINE | Facility: CLINIC | Age: 48
End: 2023-10-02
Payer: COMMERCIAL

## 2023-10-02 VITALS
SYSTOLIC BLOOD PRESSURE: 127 MMHG | RESPIRATION RATE: 16 BRPM | DIASTOLIC BLOOD PRESSURE: 72 MMHG | HEART RATE: 75 BPM | HEIGHT: 57 IN | WEIGHT: 188.2 LBS | BODY MASS INDEX: 40.6 KG/M2 | OXYGEN SATURATION: 98 %

## 2023-10-02 DIAGNOSIS — Z11.59 NEED FOR HEPATITIS C SCREENING TEST: ICD-10-CM

## 2023-10-02 DIAGNOSIS — Z11.4 SCREENING FOR HIV (HUMAN IMMUNODEFICIENCY VIRUS): ICD-10-CM

## 2023-10-02 DIAGNOSIS — Z12.4 CERVICAL CANCER SCREENING: ICD-10-CM

## 2023-10-02 DIAGNOSIS — Z13.220 SCREENING FOR LIPID DISORDERS: ICD-10-CM

## 2023-10-02 DIAGNOSIS — E66.01 MORBID OBESITY (H): ICD-10-CM

## 2023-10-02 DIAGNOSIS — E78.5 DYSLIPIDEMIA: ICD-10-CM

## 2023-10-02 DIAGNOSIS — Z12.31 VISIT FOR SCREENING MAMMOGRAM: ICD-10-CM

## 2023-10-02 DIAGNOSIS — E11.65 TYPE 2 DIABETES MELLITUS WITH HYPERGLYCEMIA, WITHOUT LONG-TERM CURRENT USE OF INSULIN (H): ICD-10-CM

## 2023-10-02 DIAGNOSIS — Z00.00 ROUTINE GENERAL MEDICAL EXAMINATION AT A HEALTH CARE FACILITY: Primary | ICD-10-CM

## 2023-10-02 DIAGNOSIS — Z12.11 SCREEN FOR COLON CANCER: ICD-10-CM

## 2023-10-02 DIAGNOSIS — Z12.11 SCREENING FOR COLON CANCER: ICD-10-CM

## 2023-10-02 LAB
ALBUMIN SERPL BCG-MCNC: 4.3 G/DL (ref 3.5–5.2)
ALP SERPL-CCNC: 66 U/L (ref 35–104)
ALT SERPL W P-5'-P-CCNC: 20 U/L (ref 0–50)
ANION GAP SERPL CALCULATED.3IONS-SCNC: 14 MMOL/L (ref 7–15)
AST SERPL W P-5'-P-CCNC: 24 U/L (ref 0–45)
BASO+EOS+MONOS # BLD AUTO: ABNORMAL 10*3/UL
BASO+EOS+MONOS NFR BLD AUTO: ABNORMAL %
BASOPHILS # BLD AUTO: 0 10E3/UL (ref 0–0.2)
BASOPHILS NFR BLD AUTO: 0 %
BILIRUB SERPL-MCNC: 0.7 MG/DL
BUN SERPL-MCNC: 10.3 MG/DL (ref 6–20)
CALCIUM SERPL-MCNC: 9.3 MG/DL (ref 8.6–10)
CHLORIDE SERPL-SCNC: 104 MMOL/L (ref 98–107)
CHOLEST SERPL-MCNC: 234 MG/DL
CREAT SERPL-MCNC: 0.48 MG/DL (ref 0.51–0.95)
CREAT UR-MCNC: 53.3 MG/DL
DEPRECATED HCO3 PLAS-SCNC: 20 MMOL/L (ref 22–29)
EGFRCR SERPLBLD CKD-EPI 2021: >90 ML/MIN/1.73M2
EOSINOPHIL # BLD AUTO: 0.3 10E3/UL (ref 0–0.7)
EOSINOPHIL NFR BLD AUTO: 3 %
ERYTHROCYTE [DISTWIDTH] IN BLOOD BY AUTOMATED COUNT: 14.3 % (ref 10–15)
GLUCOSE SERPL-MCNC: 130 MG/DL (ref 70–99)
HBA1C MFR BLD: 8.2 % (ref 0–5.6)
HCT VFR BLD AUTO: 35.5 % (ref 35–47)
HDLC SERPL-MCNC: 64 MG/DL
HGB BLD-MCNC: 11.6 G/DL (ref 11.7–15.7)
IMM GRANULOCYTES # BLD: 0 10E3/UL
IMM GRANULOCYTES NFR BLD: 0 %
LDLC SERPL CALC-MCNC: 137 MG/DL
LYMPHOCYTES # BLD AUTO: 4 10E3/UL (ref 0.8–5.3)
LYMPHOCYTES NFR BLD AUTO: 35 %
MCH RBC QN AUTO: 24.5 PG (ref 26.5–33)
MCHC RBC AUTO-ENTMCNC: 32.7 G/DL (ref 31.5–36.5)
MCV RBC AUTO: 75 FL (ref 78–100)
MICROALBUMIN UR-MCNC: <12 MG/L
MICROALBUMIN/CREAT UR: NORMAL MG/G{CREAT}
MONOCYTES # BLD AUTO: 0.6 10E3/UL (ref 0–1.3)
MONOCYTES NFR BLD AUTO: 6 %
NEUTROPHILS # BLD AUTO: 6.5 10E3/UL (ref 1.6–8.3)
NEUTROPHILS NFR BLD AUTO: 57 %
NONHDLC SERPL-MCNC: 170 MG/DL
PLATELET # BLD AUTO: 325 10E3/UL (ref 150–450)
POTASSIUM SERPL-SCNC: 3.8 MMOL/L (ref 3.4–5.3)
PROT SERPL-MCNC: 7.6 G/DL (ref 6.4–8.3)
RBC # BLD AUTO: 4.73 10E6/UL (ref 3.8–5.2)
SODIUM SERPL-SCNC: 138 MMOL/L (ref 135–145)
TRIGL SERPL-MCNC: 165 MG/DL
TSH SERPL DL<=0.005 MIU/L-ACNC: 2.08 UIU/ML (ref 0.3–4.2)
WBC # BLD AUTO: 11.5 10E3/UL (ref 4–11)

## 2023-10-02 PROCEDURE — 99214 OFFICE O/P EST MOD 30 MIN: CPT | Mod: 25 | Performed by: FAMILY MEDICINE

## 2023-10-02 PROCEDURE — 80061 LIPID PANEL: CPT | Performed by: FAMILY MEDICINE

## 2023-10-02 PROCEDURE — 83036 HEMOGLOBIN GLYCOSYLATED A1C: CPT | Performed by: FAMILY MEDICINE

## 2023-10-02 PROCEDURE — 82570 ASSAY OF URINE CREATININE: CPT | Performed by: FAMILY MEDICINE

## 2023-10-02 PROCEDURE — 36415 COLL VENOUS BLD VENIPUNCTURE: CPT | Performed by: FAMILY MEDICINE

## 2023-10-02 PROCEDURE — 86803 HEPATITIS C AB TEST: CPT | Performed by: FAMILY MEDICINE

## 2023-10-02 PROCEDURE — 84443 ASSAY THYROID STIM HORMONE: CPT | Performed by: FAMILY MEDICINE

## 2023-10-02 PROCEDURE — 87389 HIV-1 AG W/HIV-1&-2 AB AG IA: CPT | Performed by: FAMILY MEDICINE

## 2023-10-02 PROCEDURE — 99396 PREV VISIT EST AGE 40-64: CPT | Mod: 25 | Performed by: FAMILY MEDICINE

## 2023-10-02 PROCEDURE — 87624 HPV HI-RISK TYP POOLED RSLT: CPT | Performed by: FAMILY MEDICINE

## 2023-10-02 PROCEDURE — 85025 COMPLETE CBC W/AUTO DIFF WBC: CPT | Performed by: FAMILY MEDICINE

## 2023-10-02 PROCEDURE — 80053 COMPREHEN METABOLIC PANEL: CPT | Performed by: FAMILY MEDICINE

## 2023-10-02 PROCEDURE — 90715 TDAP VACCINE 7 YRS/> IM: CPT | Performed by: FAMILY MEDICINE

## 2023-10-02 PROCEDURE — G0145 SCR C/V CYTO,THINLAYER,RESCR: HCPCS | Performed by: FAMILY MEDICINE

## 2023-10-02 PROCEDURE — 90472 IMMUNIZATION ADMIN EACH ADD: CPT | Performed by: FAMILY MEDICINE

## 2023-10-02 PROCEDURE — 90682 RIV4 VACC RECOMBINANT DNA IM: CPT | Performed by: FAMILY MEDICINE

## 2023-10-02 PROCEDURE — 82043 UR ALBUMIN QUANTITATIVE: CPT | Performed by: FAMILY MEDICINE

## 2023-10-02 PROCEDURE — 90471 IMMUNIZATION ADMIN: CPT | Performed by: FAMILY MEDICINE

## 2023-10-02 RX ORDER — BLOOD-GLUCOSE SENSOR
EACH MISCELLANEOUS
Refills: 3 | Status: CANCELLED | OUTPATIENT
Start: 2023-10-02

## 2023-10-02 RX ORDER — FLASH GLUCOSE SENSOR
1 KIT MISCELLANEOUS
Qty: 6 EACH | Refills: 3 | Status: SHIPPED | OUTPATIENT
Start: 2023-10-02

## 2023-10-02 ASSESSMENT — ENCOUNTER SYMPTOMS
COUGH: 0
PARESTHESIAS: 0
ABDOMINAL PAIN: 0
ARTHRALGIAS: 0
DIZZINESS: 0
HEARTBURN: 0
HEMATOCHEZIA: 0
SORE THROAT: 0
DYSURIA: 0
PALPITATIONS: 0
HEADACHES: 0
FEVER: 0
WEAKNESS: 0
HEMATURIA: 0
BREAST MASS: 0
JOINT SWELLING: 0
FREQUENCY: 0
NAUSEA: 0
CHILLS: 0
MYALGIAS: 0
CONSTIPATION: 0
DIARRHEA: 0
NERVOUS/ANXIOUS: 0
SHORTNESS OF BREATH: 0
EYE PAIN: 0

## 2023-10-02 NOTE — PROGRESS NOTES
ASSESSMENT/PLAN:       ICD-10-CM    1. Routine general medical examination at a health care facility  Z00.00       2. Visit for screening mammogram  Z12.31 MA SCREENING DIGITAL BILAT - Future  (s+30)      3. Screen for colon cancer  Z12.11 Colonoscopy Screening  Referral      4. Screening for HIV (human immunodeficiency virus)  Z11.4 HIV Antigen Antibody Combo     HIV Antigen Antibody Combo      5. Need for hepatitis C screening test  Z11.59 Hepatitis C Screen Reflex to HCV RNA Quant and Genotype     Hepatitis C Screen Reflex to HCV RNA Quant and Genotype      6. Cervical cancer screening  Z12.4 Pap Screen with HPV - recommended age 30 - 65 years      7. Type 2 diabetes mellitus with hyperglycemia, without long-term current use of insulin (H)  E11.65 blood glucose monitoring (NO BRAND SPECIFIED) meter device kit     HEMOGLOBIN A1C     Lipid panel reflex to direct LDL Fasting     Albumin Random Urine Quantitative with Creat Ratio     Adult Eye  Referral     HEMOGLOBIN A1C     Lipid panel reflex to direct LDL Fasting     Albumin Random Urine Quantitative with Creat Ratio     AMB Adult Diabetes Educator Referral      8. Morbid obesity (H)  E66.01 TSH with free T4 reflex     TSH with free T4 reflex      9. Screening for colon cancer  Z12.11       10. Screening for lipid disorders  Z13.220       11. Dyslipidemia  E78.5         Patient is here today for annual physical exam.  Health maintenance labs and testing as above.  Following issues addressed.  1: Type 2 diabetes: Last checked was 1 year ago and A1c was at 6.3.  2 years ago it was at 11.7.  At time of documentation A1c is back again at 8.2.  I will make a referral to diabetes specialist.  2: Discussed dyslipidemia and need to be on statin for LDL goal to be less than 70.  Recheck lipid panel as above  3: Morbid obesity: Lifestyle modification is strongly advised.    COUNSELING:  Reviewed preventive health counseling, as reflected in patient  "instructions       Regular exercise       Healthy diet/nutrition       Vision screening       Colorectal Cancer Screening       Consider Hep C screening for all patients one time for ages 18-79 years       HIV screeninx in teen years, 1x in adult years, and at intervals if high risk      BMI:   Estimated body mass index is 40.73 kg/m  as calculated from the following:    Height as of this encounter: 1.448 m (4' 9\").    Weight as of this encounter: 85.4 kg (188 lb 3.2 oz).   Weight management plan: Discussed healthy diet and exercise guidelines      She reports that she has never smoked. She has never used smokeless tobacco.     SUBJECTIVE:   CC: Clinton is an 48 year old who presents for preventive health visit.       10/2/2023     2:33 PM   Additional Questions   Roomed by Elli Roblero CMA   Accompanied by N/A       Healthy Habits:     Getting at least 3 servings of Calcium per day:  Yes    Bi-annual eye exam:  Yes    Dental care twice a year:  Yes    Sleep apnea or symptoms of sleep apnea:  Daytime drowsiness    Diet:  Diabetic    Frequency of exercise:  None    Taking medications regularly:  Not Applicable    Barriers to taking medications:  None    Medication side effects:  None    Additional concerns today:  No      Today's PHQ-2 Score:       10/2/2023     2:24 PM   PHQ-2 (  [employer])   Q1: Little interest or pleasure in doing things 0   Q2: Feeling down, depressed or hopeless 0   PHQ-2 Score 0   Q1: Little interest or pleasure in doing things Not at all   Q2: Feeling down, depressed or hopeless Not at all   PHQ-2 Score 0       Have you ever done Advance Care Planning? (For example, a Health Directive, POLST, or a discussion with a medical provider or your loved ones about your wishes): No, advance care planning information given to patient to review.  Patient plans to discuss their wishes with loved ones or provider.      Social History     Tobacco Use    Smoking status: Never    Smokeless tobacco: Never "   Substance Use Topics    Alcohol use: Never             10/2/2023     2:23 PM   Alcohol Use   Prescreen: >3 drinks/day or >7 drinks/week? Not Applicable          No data to display              Reviewed orders with patient.  Reviewed health maintenance and updated orders accordingly - Yes  BP Readings from Last 3 Encounters:   10/02/23 127/72   07/01/22 132/78   07/06/21 136/80    Wt Readings from Last 3 Encounters:   10/02/23 85.4 kg (188 lb 3.2 oz)   07/01/22 81.3 kg (179 lb 3.2 oz)   07/06/21 84.8 kg (187 lb)                  Patient Active Problem List   Diagnosis    Ankle Sprain    Overweight    Migraine Headache    Hemorrhoids    Dysmenorrhea    Gestational Diabetes Mellitus    Acne    Murmurs    Anemia Of Chronic Disease    Diabetes mellitus, type 2 (H)    Tooth pain     Past Surgical History:   Procedure Laterality Date    HC REMOVAL GALLBLADDER      Description: Cholecystectomy;  Recorded: 07/16/2008;  Comments: laparoscopy    HEMORRHOID SURGERY      TUBAL LIGATION      ZZC LIGATE FALLOPIAN TUBE      Description: Tubal Ligation;  Recorded: 07/16/2008;       Social History     Tobacco Use    Smoking status: Never    Smokeless tobacco: Never   Substance Use Topics    Alcohol use: Never     Family History   Problem Relation Age of Onset    Blood Disease Father     Diabetes Sister     Diabetes Brother          Current Outpatient Medications   Medication Sig Dispense Refill    blood glucose monitoring (NO BRAND SPECIFIED) meter device kit Use to test blood sugar 1 times daily or as directed. Please dispense strips and lancets diabetes supply 1 kit 11    Continuous Blood Gluc Sensor (FREESTYLE FABIO 14 DAY SENSOR) MISC 1 Device every 14 days Change sensor as directed every 14 days 6 each 3    metFORMIN (GLUCOPHAGE) 500 MG tablet Take 2 tablets (1,000 mg) by mouth 2 times daily (with meals) 360 tablet 3    fish oil-omega-3 fatty acids 1000 MG capsule Take 1 g by mouth daily      ibuprofen (ADVIL/MOTRIN) 200 MG  tablet Take 400 mg by mouth 2 times daily as needed      Misc Natural Products (GLUCOSAMINE CHOND CMP ADVANCED PO) Take 1 tablet by mouth 2 times daily      vitamin B-12 (CYANOCOBALAMIN) 100 MCG tablet Take 1,000 mcg by mouth daily      Vitamin D, Cholecalciferol, 25 MCG (1000 UT) TABS Take 2,000 Units by mouth daily         Breast Cancer Screening:        10/2/2023     2:25 PM   Breast CA Risk Assessment (FHS-7)   Do you have a family history of breast, colon, or ovarian cancer? No / Unknown         Mammogram Screening: Recommended annual mammography  Pertinent mammograms are reviewed under the imaging tab.    History of abnormal Pap smear: NO - age 30-65 PAP every 5 years with negative HPV co-testing recommended     Reviewed and updated as needed this visit by clinical staff   Tobacco  Allergies  Meds  Problems  Med Hx  Surg Hx  Fam Hx          Reviewed and updated as needed this visit by Provider   Tobacco  Allergies  Meds  Problems  Med Hx  Surg Hx  Fam Hx         Past Medical History:   Diagnosis Date    Diabetes mellitus (H)       Past Surgical History:   Procedure Laterality Date    HC REMOVAL GALLBLADDER      Description: Cholecystectomy;  Recorded: 07/16/2008;  Comments: laparoscopy    HEMORRHOID SURGERY      TUBAL LIGATION      ZZC LIGATE FALLOPIAN TUBE      Description: Tubal Ligation;  Recorded: 07/16/2008;       Review of Systems   Constitutional:  Negative for chills and fever.   HENT:  Negative for congestion, ear pain, hearing loss and sore throat.    Eyes:  Positive for visual disturbance. Negative for pain.   Respiratory:  Negative for cough and shortness of breath.    Cardiovascular:  Positive for peripheral edema. Negative for chest pain and palpitations.   Gastrointestinal:  Negative for abdominal pain, constipation, diarrhea, heartburn, hematochezia and nausea.   Breasts:  Negative for tenderness, breast mass and discharge.   Genitourinary:  Negative for dysuria, frequency,  "genital sores, hematuria, pelvic pain, urgency, vaginal bleeding and vaginal discharge.   Musculoskeletal:  Negative for arthralgias, joint swelling and myalgias.   Skin:  Negative for rash.   Neurological:  Negative for dizziness, weakness, headaches and paresthesias.   Psychiatric/Behavioral:  Negative for mood changes. The patient is not nervous/anxious.           OBJECTIVE:   /72   Pulse 75   Resp 16   Ht 1.448 m (4' 9\")   Wt 85.4 kg (188 lb 3.2 oz)   SpO2 98%   BMI 40.73 kg/m    Physical Exam  GENERAL: healthy, alert and no distress  EYES: Eyes grossly normal to inspection, PERRL and conjunctivae and sclerae normal  HENT: ear canals and TM's normal, nose and mouth without ulcers or lesions  NECK: no adenopathy, no asymmetry, masses, or scars and thyroid normal to palpation  RESP: lungs clear to auscultation - no rales, rhonchi or wheezes  BREAST: normal without masses, tenderness or nipple discharge and no palpable axillary masses or adenopathy  CV: regular rate and rhythm, normal S1 S2, no S3 or S4, no murmur, click or rub, no peripheral edema and peripheral pulses strong  ABDOMEN: soft, nontender, no hepatosplenomegaly, no masses and bowel sounds normal   (female): normal female external genitalia, normal urethral meatus, vaginal mucosa pink, moist, well rugated, and normal cervix/adnexa/uterus without masses or discharge  MS: no gross musculoskeletal defects noted, no edema  SKIN: no suspicious lesions or rashes  NEURO: Normal strength and tone, mentation intact and speech normal  PSYCH: mentation appears normal, affect normal/bright    Diagnostic Test Results:  Labs reviewed in Epic  Results for orders placed or performed in visit on 10/02/23 (from the past 24 hour(s))   HEMOGLOBIN A1C   Result Value Ref Range    Hemoglobin A1C 8.2 (H) 0.0 - 5.6 %   CBC with platelets and differential    Narrative    The following orders were created for panel order CBC with platelets and " differential.  Procedure                               Abnormality         Status                     ---------                               -----------         ------                     CBC with platelets and d...[[id number]]  Abnormal            Final result                 Please view results for these tests on the individual orders.   CBC with platelets and differential   Result Value Ref Range    WBC Count 11.5 (H) 4.0 - 11.0 10e3/uL    RBC Count 4.73 3.80 - 5.20 10e6/uL    Hemoglobin 11.6 (L) 11.7 - 15.7 g/dL    Hematocrit 35.5 35.0 - 47.0 %    MCV 75 (L) 78 - 100 fL    MCH 24.5 (L) 26.5 - 33.0 pg    MCHC 32.7 31.5 - 36.5 g/dL    RDW 14.3 10.0 - 15.0 %    Platelet Count 325 150 - 450 10e3/uL    % Neutrophils 57 %    % Lymphocytes 35 %    % Monocytes 6 %    Mids % (Monos, Eos, Basos)      % Eosinophils 3 %    % Basophils 0 %    % Immature Granulocytes 0 %    Absolute Neutrophils 6.5 1.6 - 8.3 10e3/uL    Absolute Lymphocytes 4.0 0.8 - 5.3 10e3/uL    Absolute Monocytes 0.6 0.0 - 1.3 10e3/uL    Mids Abs (Monos, Eos, Basos)      Absolute Eosinophils 0.3 0.0 - 0.7 10e3/uL    Absolute Basophils 0.0 0.0 - 0.2 10e3/uL    Absolute Immature Granulocytes 0.0 <=0.4 10e3/uL         Meghana Regalado MD  [facility]  Prior to immunization administration, verified patients identity using patient s name and date of birth. Please see Immunization Activity for additional information.     Screening Questionnaire for Adult Immunization    Are you sick today?   No   Do you have allergies to medications, food, a vaccine component or latex?   Yes   Have you ever had a serious reaction after receiving a vaccination?   No   Do you have a long-term health problem with heart, lung, kidney, or metabolic disease (e.g., diabetes), asthma, a blood disorder, no spleen, complement component deficiency, a cochlear implant, or a spinal fluid leak?  Are you on long-term aspirin therapy?   No   Do you have cancer,  leukemia, HIV/AIDS, or any other immune system problem?   No   Do you have a parent, brother, or sister with an immune system problem?   No   In the past 3 months, have you taken medications that affect  your immune system, such as prednisone, other steroids, or anticancer drugs; drugs for the treatment of rheumatoid arthritis, Crohn s disease, or psoriasis; or have you had radiation treatments?   No   Have you had a seizure, or a brain or other nervous system problem?   No   During the past year, have you received a transfusion of blood or blood    products, or been given immune (gamma) globulin or antiviral drug?   No   For women: Are you pregnant or is there a chance you could become       pregnant during the next month?   No   Have you received any vaccinations in the past 4 weeks?   No     Immunization questionnaire was positive for at least one answer.  Notified - ok to give.      Patient instructed to remain in clinic for 15 minutes afterwards, and to report any adverse reactions.     Screening performed by Myrna Husain MA on 10/2/2023 at 3:33 PM.

## 2023-10-02 NOTE — PATIENT INSTRUCTIONS

## 2023-10-03 ENCOUNTER — MYC MEDICAL ADVICE (OUTPATIENT)
Dept: FAMILY MEDICINE | Facility: CLINIC | Age: 48
End: 2023-10-03
Payer: COMMERCIAL

## 2023-10-03 DIAGNOSIS — E11.9 TYPE 2 DIABETES MELLITUS WITHOUT COMPLICATION, WITHOUT LONG-TERM CURRENT USE OF INSULIN (H): Primary | ICD-10-CM

## 2023-10-03 DIAGNOSIS — E78.5 HYPERLIPIDEMIA LDL GOAL <70: ICD-10-CM

## 2023-10-03 LAB
HCV AB SERPL QL IA: NONREACTIVE
HIV 1+2 AB+HIV1 P24 AG SERPL QL IA: NONREACTIVE

## 2023-10-03 RX ORDER — ATORVASTATIN CALCIUM 20 MG/1
20 TABLET, FILM COATED ORAL DAILY
Qty: 90 TABLET | Refills: 3 | Status: SHIPPED | OUTPATIENT
Start: 2023-10-03

## 2023-10-04 ENCOUNTER — TELEPHONE (OUTPATIENT)
Dept: FAMILY MEDICINE | Facility: CLINIC | Age: 48
End: 2023-10-04
Payer: COMMERCIAL

## 2023-10-04 DIAGNOSIS — E11.9 TYPE 2 DIABETES MELLITUS WITHOUT COMPLICATION, WITHOUT LONG-TERM CURRENT USE OF INSULIN (H): Primary | ICD-10-CM

## 2023-10-04 LAB
BKR LAB AP GYN ADEQUACY: NORMAL
BKR LAB AP GYN INTERPRETATION: NORMAL
BKR LAB AP HPV REFLEX: NORMAL
BKR LAB AP PREVIOUS ABNORMAL: NORMAL
PATH REPORT.COMMENTS IMP SPEC: NORMAL
PATH REPORT.COMMENTS IMP SPEC: NORMAL
PATH REPORT.RELEVANT HX SPEC: NORMAL

## 2023-10-04 NOTE — TELEPHONE ENCOUNTER
Fax from pharmacy stating Trulicity 0.75 mg/0.5mL requires a PA.   Key [id number]    Covered alternatives : Bydureon, byetta 10 mcg pen, januvia, jardiance, metformin    Do you want to start PA?

## 2023-10-05 NOTE — TELEPHONE ENCOUNTER
Please inform patient that Trulicity is not covered with insurance and I am changing it to Byetta.  The inconvenience at its twice a daily injection but there is a benefit of weight loss.  If she decides not to pursue then she should wait to follow-up with diabetes specialist educator.    Meghana Regalado MD

## 2023-10-06 LAB
HUMAN PAPILLOMA VIRUS 16 DNA: NEGATIVE
HUMAN PAPILLOMA VIRUS 18 DNA: NEGATIVE
HUMAN PAPILLOMA VIRUS FINAL DIAGNOSIS: NORMAL
HUMAN PAPILLOMA VIRUS OTHER HR: NEGATIVE

## 2023-10-09 ENCOUNTER — TRANSFERRED RECORDS (OUTPATIENT)
Dept: HEALTH INFORMATION MANAGEMENT | Facility: CLINIC | Age: 48
End: 2023-10-09
Payer: COMMERCIAL

## 2023-10-16 ENCOUNTER — ANCILLARY PROCEDURE (OUTPATIENT)
Dept: MAMMOGRAPHY | Facility: CLINIC | Age: 48
End: 2023-10-16
Attending: FAMILY MEDICINE
Payer: COMMERCIAL

## 2023-10-16 DIAGNOSIS — Z12.31 VISIT FOR SCREENING MAMMOGRAM: ICD-10-CM

## 2023-10-16 PROCEDURE — 77067 SCR MAMMO BI INCL CAD: CPT

## 2023-11-14 ENCOUNTER — PATIENT OUTREACH (OUTPATIENT)
Dept: FAMILY MEDICINE | Facility: CLINIC | Age: 48
End: 2023-11-14
Payer: COMMERCIAL

## 2023-11-14 NOTE — TELEPHONE ENCOUNTER
Patient Quality Outreach    Patient is due for the following:   Diabetes -  A1C    Next Steps:   Patient has upcoming appointment, these items will be addressed at that time.    Type of outreach:    Chart review performed, no outreach needed.      Questions for provider review:    None           Elli Roblero MA

## 2023-12-04 ENCOUNTER — TELEPHONE (OUTPATIENT)
Dept: OPHTHALMOLOGY | Facility: CLINIC | Age: 48
End: 2023-12-04
Payer: COMMERCIAL

## 2024-03-09 ENCOUNTER — HEALTH MAINTENANCE LETTER (OUTPATIENT)
Age: 49
End: 2024-03-09

## 2024-07-27 ENCOUNTER — HEALTH MAINTENANCE LETTER (OUTPATIENT)
Age: 49
End: 2024-07-27

## 2024-10-01 ENCOUNTER — PATIENT OUTREACH (OUTPATIENT)
Dept: FAMILY MEDICINE | Facility: CLINIC | Age: 49
End: 2024-10-01
Payer: COMMERCIAL

## 2024-10-01 NOTE — TELEPHONE ENCOUNTER
Patient Quality Outreach    Patient is due for the following:   Diabetes -  A1C, LDL (Fasting), Eye Exam, and Foot Exam  Depression  -  PHQ-9 needed    Next Steps:   Schedule a Adult Preventative    Type of outreach:    Sent [email or web address] message.      Questions for provider review:    None           Denisse Espinoza MA

## 2024-12-14 ENCOUNTER — HEALTH MAINTENANCE LETTER (OUTPATIENT)
Age: 49
End: 2024-12-14

## 2025-02-12 NOTE — TELEPHONE ENCOUNTER
Patient has not been seen since 10/2023.  Please ask patient if she has been followed elsewhere and please direct refill of medication if needed.    Meghana Regalado MD

## 2025-02-17 ENCOUNTER — MYC MEDICAL ADVICE (OUTPATIENT)
Dept: FAMILY MEDICINE | Facility: CLINIC | Age: 50
End: 2025-02-17
Payer: COMMERCIAL

## 2025-02-17 NOTE — TELEPHONE ENCOUNTER
Second Attempt: I sent a my chart message to the patient to please contact us to schedule a Med Check or let us know if she is no longer coming to our clinic.

## 2025-02-18 NOTE — TELEPHONE ENCOUNTER
Patient responded to [employer] message and confirmed that she has established care at a new clinic.    Rajni Last RN

## 2025-03-16 ENCOUNTER — HEALTH MAINTENANCE LETTER (OUTPATIENT)
Age: 50
End: 2025-03-16

## 2025-06-29 ENCOUNTER — HEALTH MAINTENANCE LETTER (OUTPATIENT)
Age: 50
End: 2025-06-29